# Patient Record
Sex: MALE | Race: WHITE | HISPANIC OR LATINO | URBAN - METROPOLITAN AREA
[De-identification: names, ages, dates, MRNs, and addresses within clinical notes are randomized per-mention and may not be internally consistent; named-entity substitution may affect disease eponyms.]

---

## 2024-04-23 ENCOUNTER — TRANSCRIBE ORDERS (OUTPATIENT)
Dept: FAMILY MEDICINE | Facility: CLINIC | Age: 49
End: 2024-04-23

## 2024-04-23 DIAGNOSIS — H11.001 PTERYGIUM OF RIGHT EYE: Primary | ICD-10-CM

## 2024-04-23 DIAGNOSIS — H43.392 VITREOUS SYNERESIS OF LEFT EYE: ICD-10-CM

## 2024-05-16 ENCOUNTER — OFFICE VISIT (OUTPATIENT)
Dept: OPHTHALMOLOGY | Facility: CLINIC | Age: 49
End: 2024-05-16
Attending: FAMILY MEDICINE

## 2024-05-16 DIAGNOSIS — H11.001 PTERYGIUM OF RIGHT EYE: Primary | ICD-10-CM

## 2024-05-16 DIAGNOSIS — H43.392 VITREOUS SYNERESIS OF LEFT EYE: ICD-10-CM

## 2024-05-16 PROCEDURE — 99213 OFFICE O/P EST LOW 20 MIN: CPT | Performed by: OPHTHALMOLOGY

## 2024-05-16 PROCEDURE — 99203 OFFICE O/P NEW LOW 30 MIN: CPT | Mod: GC | Performed by: OPHTHALMOLOGY

## 2024-05-16 ASSESSMENT — REFRACTION_WEARINGRX
OD_SPHERE: -4.25
OD_AXIS: 078
OD_CYLINDER: +1.50
OS_CYLINDER: +0.50
SPECS_TYPE: SVL
OS_SPHERE: -3.00
OS_AXIS: 035

## 2024-05-16 ASSESSMENT — CONF VISUAL FIELD
OS_NORMAL: 1
OD_SUPERIOR_TEMPORAL_RESTRICTION: 0
METHOD: COUNTING FINGERS
OS_INFERIOR_TEMPORAL_RESTRICTION: 0
OS_INFERIOR_NASAL_RESTRICTION: 0
OD_NORMAL: 1
OD_INFERIOR_TEMPORAL_RESTRICTION: 0
OS_SUPERIOR_NASAL_RESTRICTION: 0
OD_INFERIOR_NASAL_RESTRICTION: 0
OS_SUPERIOR_TEMPORAL_RESTRICTION: 0
OD_SUPERIOR_NASAL_RESTRICTION: 0

## 2024-05-16 ASSESSMENT — VISUAL ACUITY
OS_CC: 20/25
METHOD: SNELLEN - LINEAR
OS_CC+: +2
OD_CC+: -3
CORRECTION_TYPE: GLASSES
OD_CC: 20/25

## 2024-05-16 ASSESSMENT — TONOMETRY
IOP_METHOD: TONOPEN
OD_IOP_MMHG: 11
OS_IOP_MMHG: 14

## 2024-05-16 ASSESSMENT — SLIT LAMP EXAM - LIDS
COMMENTS: NORMAL
COMMENTS: NORMAL

## 2024-05-16 ASSESSMENT — EXTERNAL EXAM - LEFT EYE: OS_EXAM: NORMAL

## 2024-05-16 ASSESSMENT — EXTERNAL EXAM - RIGHT EYE: OD_EXAM: NORMAL

## 2024-05-16 NOTE — NURSING NOTE
Chief Complaints and History of Present Illnesses   Patient presents with    Pterygium Evaluation     Here for Pterygium right eye      Chief Complaint(s) and History of Present Illness(es)       Pterygium Evaluation              Associated symptoms: tearing.  Negative for eye pain    Treatments tried: eye drops    Pain scale: 0/10    Comments: Here for Pterygium right eye               Comments    Pt states pterygium right eye appeared over 15 years ago.   He tells me he gets it monitored for growth every 2 years.  Does not affect his right eye vision.   Over the last 2 months, the right eye began tearing every morning.   Had pterygium left eye and [facility] removed it 7 years ago.     Cr Clarke 1:27 PM May 16, 2024

## 2024-05-16 NOTE — PROGRESS NOTES
Chief complaint   Pterygium right eye     HPI    Pedro Capone 49 year old male referred by his PCP for right eye pterygium.       Chief Complaint(s) and History of Present Illness(es)       Pterygium Evaluation    Associated symptoms include tearing.  Negative for eye pain.  Treatments tried include eye drops.  Pain was noted as 0/10. Additional comments: Here for Pterygium right eye              Comments    Pt states pterygium right eye appeared over 15 years ago.   He tells me he gets it monitored for growth every 2 years.  Does not affect his right eye vision.   Over the last 2 months, the right eye began tearing every morning.   Had pterygium left eye and [facility] removed it 7 years ago.     Cr Ho 1:27 PM May 16, 2024                     Interval hx 05/16/2024    Past ocular history   Prior eye surgery/laser/Trauma:   Pterygium excision left eye 2017    CTL wearer:No  Glasses : yes  Family Hx of eye disease: -    PMH   History reviewed. No pertinent past medical history.    PSH     Past Surgical History:   Procedure Laterality Date    EXCISE PTERYGIUM Left 2017    Pterygium removal       Meds     No current outpatient medications on file.     No current facility-administered medications for this visit.       Labs   -    Imaging   -    Drops Currently Taking   -    Assessment/Plan 05/16/2024   # Pterygium, right eye   ( 5/16/24: right eye nasal [pterygium < 1.5mm)  - Disc pterygium excision if p[terygium growing  or larger than 1.5mm.  Previously followed every two years at [facility].      #Hx pterygium excision left eye   - [facility]      Follow up:1 - 1.5 years. N- call with any eye  issues.  Oph:    Akanksha Roman MD  Cornea and External Disease Fellow  [facility]     Attending Physician Attestation:  Complete documentation of historical and exam elements from today's encounter can be found in the full encounter summary report (not reduplicated in this progress note).  I  personally obtained the chief complaint(s) and history of present illness.  I confirmed and edited as necessary the review of systems, past medical/surgical history, family history, social history, and examination findings as documented by others; and I examined the patient myself.  I personally reviewed the relevant tests, images, and reports as documented above.  I formulated and edited as necessary the assessment and plan and discussed the findings and management plan with the patient and family. - Sean Thompson MD